# Patient Record
Sex: FEMALE | Employment: FULL TIME | URBAN - METROPOLITAN AREA
[De-identification: names, ages, dates, MRNs, and addresses within clinical notes are randomized per-mention and may not be internally consistent; named-entity substitution may affect disease eponyms.]

---

## 2019-07-25 ENCOUNTER — OFFICE VISIT (OUTPATIENT)
Dept: INTERNAL MEDICINE CLINIC | Facility: CLINIC | Age: 24
End: 2019-07-25

## 2019-07-25 VITALS
TEMPERATURE: 98.5 F | HEART RATE: 84 BPM | HEIGHT: 62 IN | SYSTOLIC BLOOD PRESSURE: 110 MMHG | WEIGHT: 161.82 LBS | DIASTOLIC BLOOD PRESSURE: 90 MMHG | BODY MASS INDEX: 29.78 KG/M2

## 2019-07-25 DIAGNOSIS — Z23 NEED FOR TDAP VACCINATION: ICD-10-CM

## 2019-07-25 DIAGNOSIS — J02.9 PHARYNGITIS, UNSPECIFIED ETIOLOGY: Primary | ICD-10-CM

## 2019-07-25 PROCEDURE — 99202 OFFICE O/P NEW SF 15 MIN: CPT | Performed by: PHYSICIAN ASSISTANT

## 2019-07-25 PROCEDURE — 90715 TDAP VACCINE 7 YRS/> IM: CPT | Performed by: PHYSICIAN ASSISTANT

## 2019-07-25 PROCEDURE — 90471 IMMUNIZATION ADMIN: CPT | Performed by: PHYSICIAN ASSISTANT

## 2019-07-25 RX ORDER — AMOXICILLIN AND CLAVULANATE POTASSIUM 875; 125 MG/1; MG/1
1 TABLET, FILM COATED ORAL EVERY 12 HOURS SCHEDULED
Qty: 20 TABLET | Refills: 0 | Status: SHIPPED | OUTPATIENT
Start: 2019-07-25 | End: 2019-08-04

## 2019-07-25 RX ORDER — AMOXICILLIN 250 MG/1
CAPSULE ORAL EVERY 8 HOURS SCHEDULED
COMMUNITY

## 2019-07-25 NOTE — PROGRESS NOTES
Assessment/Plan:      Diagnoses and all orders for this visit:    Pharyngitis, unspecified etiology  -     amoxicillin-clavulanate (AUGMENTIN) 875-125 mg per tablet; Take 1 tablet by mouth every 12 (twelve) hours for 10 days    Need for Tdap vaccination  -     TDAP VACCINE GREATER THAN OR EQUAL TO 6YO IM    Other orders  -     amoxicillin (AMOXIL) 250 mg capsule; Take by mouth every 8 (eight) hours      patient is a 68-year-old female here today to establish with our office and for concern of 4 days of sore throat and other symptoms as described in HPI despite taking 250 mg t i d  that she received from her family in [place]  She has been taking the antibiotic now for about 3-4 days  On examination she does have erythema as well as exudate and enlargement of bilateral tonsils concerning for pharyngitis in addition to her symptoms including feeling feverish, body aches as well as some slight submandibular lymphadenopathy  It is unclear if this could be viral and that is why she is not responding to the antibiotic, or if the antibiotic is not high enough dose to make an impact on the infection  At this time I will avoid throat culture is patient has no insurance and to avoid becoming costly to the patient  I will have her discontinue amoxicillin and switch her to Augmentin 1 pill twice a day  I also discussed with her symptomatic treatment to include ibuprofen or Motrin as needed for pain, saltwater gargles 3 times a day, Chloraseptic spray as well as staying hydrated with plenty of clear fluids and considering warm tea with honey to soothe the throat  I also recommended foods that are easy to swallow including Jell-O, [place] ice, noodles and soup  Patient expresses understanding  We will see how patient responds over the next 3-5 days and she was encouraged to call if symptoms persist or worsen despite treatment    Certainly if this is a prolonged infection I may also need to consider blood work including mono testing  Patient Tdap updated today  Chief Complaint   Patient presents with    [facility] [place] [facility]     Sore throat ,body aches , headaches ,bilateral leg pain       Subjective:     Patient ID: Gume Bhatti is a 27 y o  female  23y/o female here today for 4 days of sore throat, earache, feeling feverish, body aches and headaches  She has never had throat infection before  She is using amoxicillin sent from [facility] by her mother and taking it for 3-4 days without any benefit  She takes 250mg three times a day  Review of Systems   Constitutional: Positive for chills and fatigue  HENT: Positive for ear pain, sore throat and trouble swallowing  Negative for congestion  Respiratory: Positive for cough  Cardiovascular: Negative  Gastrointestinal: Negative for abdominal pain, nausea and vomiting  Genitourinary: Negative  Neurological: Positive for headaches  Psychiatric/Behavioral: Negative  The following portions of the patient's history were reviewed and updated as appropriate: allergies, current medications, past family history, past medical history, past social history, past surgical history and problem list       Objective:     Physical Exam   Constitutional: She is oriented to person, place, and time  She appears well-developed  Pt appearing mildly ill but no acute distress   HENT:   Head: Normocephalic  Right Ear: Tympanic membrane normal    Left Ear: Tympanic membrane normal    Nose: Nose normal    Mouth/Throat: Posterior oropharyngeal erythema present  Tonsils are 3+ on the right  Tonsils are 3+ on the left  Tonsillar exudate  Mild erythema within B/L ear canals without swelling or purulent discharge   Neck: Neck supple  Cardiovascular: Normal rate, regular rhythm and normal heart sounds  Pulmonary/Chest: Effort normal and breath sounds normal    Abdominal: Soft  Normal appearance and bowel sounds are normal  There is no tenderness  Lymphadenopathy:        Head (right side): Submandibular (tender) adenopathy present  Head (left side): Submandibular (tender) adenopathy present  Neurological: She is alert and oriented to person, place, and time  Psychiatric: She has a normal mood and affect  Vitals reviewed        Vitals:    07/25/19 1347   BP: 110/90   BP Location: Left arm   Patient Position: Sitting   Cuff Size: Adult   Pulse: 84   Temp: 98 5 °F (36 9 °C)   TempSrc: Oral   Weight: 73 4 kg (161 lb 13 1 oz)   Height: 5' 2" (1 575 m)

## 2019-07-25 NOTE — PATIENT INSTRUCTIONS
Ibuprofen (motrin) for throat pain    Chloraseptic spray    Salt water gargles    Warm tea with honey